# Patient Record
Sex: FEMALE | Race: BLACK OR AFRICAN AMERICAN | NOT HISPANIC OR LATINO
[De-identification: names, ages, dates, MRNs, and addresses within clinical notes are randomized per-mention and may not be internally consistent; named-entity substitution may affect disease eponyms.]

---

## 2019-05-22 PROBLEM — Z00.00 ENCOUNTER FOR PREVENTIVE HEALTH EXAMINATION: Status: ACTIVE | Noted: 2019-05-22

## 2019-05-30 ENCOUNTER — APPOINTMENT (OUTPATIENT)
Dept: OTOLARYNGOLOGY | Facility: CLINIC | Age: 44
End: 2019-05-30
Payer: COMMERCIAL

## 2019-05-30 VITALS
HEIGHT: 64 IN | SYSTOLIC BLOOD PRESSURE: 130 MMHG | BODY MASS INDEX: 22.2 KG/M2 | DIASTOLIC BLOOD PRESSURE: 78 MMHG | WEIGHT: 130 LBS | HEART RATE: 88 BPM

## 2019-05-30 DIAGNOSIS — Z80.9 FAMILY HISTORY OF MALIGNANT NEOPLASM, UNSPECIFIED: ICD-10-CM

## 2019-05-30 PROCEDURE — 99203 OFFICE O/P NEW LOW 30 MIN: CPT | Mod: 25

## 2019-05-30 PROCEDURE — 31575 DIAGNOSTIC LARYNGOSCOPY: CPT

## 2019-05-30 RX ORDER — METHIMAZOLE 5 MG/1
TABLET ORAL
Refills: 0 | Status: ACTIVE | COMMUNITY

## 2019-05-30 NOTE — HISTORY OF PRESENT ILLNESS
[de-identified] : MAEGAN ALEJANDRA is a 44 year woman with a history of Grave's disease who complains of one month os foreign body sensation in her throat. She feels it is mostly on the right side deep down Her voice is normal. She has not other symptoms. She feels the sensation the most when swallowing. She is not aware anything making any difference although she and a glass of proseco last nigh and feels worse today.\par

## 2019-05-30 NOTE — REASON FOR VISIT
[Throat Pain] : throat pain [Initial Consultation] : an initial consultation for [FreeTextEntry2] : foreign body sensation in throat.

## 2019-07-23 ENCOUNTER — APPOINTMENT (OUTPATIENT)
Dept: OTOLARYNGOLOGY | Facility: CLINIC | Age: 44
End: 2019-07-23

## 2019-07-30 ENCOUNTER — APPOINTMENT (OUTPATIENT)
Dept: OTOLARYNGOLOGY | Facility: CLINIC | Age: 44
End: 2019-07-30
Payer: COMMERCIAL

## 2019-07-30 VITALS
BODY MASS INDEX: 22.2 KG/M2 | DIASTOLIC BLOOD PRESSURE: 78 MMHG | WEIGHT: 130 LBS | HEART RATE: 94 BPM | HEIGHT: 64 IN | SYSTOLIC BLOOD PRESSURE: 130 MMHG

## 2019-07-30 PROCEDURE — 31575 DIAGNOSTIC LARYNGOSCOPY: CPT

## 2019-07-30 PROCEDURE — 99213 OFFICE O/P EST LOW 20 MIN: CPT | Mod: 25

## 2019-07-30 NOTE — HISTORY OF PRESENT ILLNESS
[de-identified] : MAEGAN ALEJANDRA is a 44 year woman with a history of Grave's disease who had one month of foreign body sensation. She has been on reflux diet and used Prilosec for 2 weeks. She feels much better.

## 2019-07-30 NOTE — REASON FOR VISIT
[Gastroesophageal Reflux] : gastroesophageal reflux [Subsequent Evaluation] : a subsequent evaluation for [FreeTextEntry2] : lpr

## 2020-07-07 ENCOUNTER — APPOINTMENT (OUTPATIENT)
Dept: OTOLARYNGOLOGY | Facility: CLINIC | Age: 45
End: 2020-07-07
Payer: COMMERCIAL

## 2020-07-07 VITALS — WEIGHT: 130 LBS | BODY MASS INDEX: 22.2 KG/M2 | HEIGHT: 64 IN

## 2020-07-07 DIAGNOSIS — E05.00 THYROTOXICOSIS WITH DIFFUSE GOITER W/OUT THYROTOXIC CRISIS OR STORM: ICD-10-CM

## 2020-07-07 PROCEDURE — 31575 DIAGNOSTIC LARYNGOSCOPY: CPT

## 2020-07-07 PROCEDURE — 99213 OFFICE O/P EST LOW 20 MIN: CPT | Mod: 25

## 2020-07-07 NOTE — ASSESSMENT
[FreeTextEntry1] : MAEGAN ALEJANDRA is having exacerbation of LPR. I suggested and discussed in detail a strict reflux diet and gave the patient a handout.\par I am recommending Prilosec 20 mg 30-40 minutes before breakfast on an empty stomach.\par I am recommending Pepcid 20mg  before bedtime.\par \par RTC 3-4 weeks.

## 2020-07-07 NOTE — HISTORY OF PRESENT ILLNESS
[de-identified] : MAEGAN ALEJANDRA is a 45 year woman with a history of Graves disease who I treated last year for LPR. She complains of several weeks of balloon sensation in her throat. Her voice is good and she is eating normally. She also had a sense of numbness on her right face which is improved.

## 2020-08-04 ENCOUNTER — APPOINTMENT (OUTPATIENT)
Dept: OTOLARYNGOLOGY | Facility: CLINIC | Age: 45
End: 2020-08-04
Payer: COMMERCIAL

## 2020-08-04 VITALS — HEIGHT: 64 IN | WEIGHT: 130 LBS | BODY MASS INDEX: 22.2 KG/M2 | TEMPERATURE: 98.1 F

## 2020-08-04 DIAGNOSIS — K21.9 GASTRO-ESOPHAGEAL REFLUX DISEASE W/OUT ESOPHAGITIS: ICD-10-CM

## 2020-08-04 DIAGNOSIS — R09.89 OTHER SPECIFIED SYMPTOMS AND SIGNS INVOLVING THE CIRCULATORY AND RESPIRATORY SYSTEMS: ICD-10-CM

## 2020-08-04 PROCEDURE — 31575 DIAGNOSTIC LARYNGOSCOPY: CPT

## 2020-08-04 RX ORDER — FAMOTIDINE 10 MG/1
TABLET, FILM COATED ORAL
Refills: 0 | Status: ACTIVE | COMMUNITY

## 2020-08-04 NOTE — ASSESSMENT
[FreeTextEntry1] : MAEGAN ALEJANDRA has improved but ongoing LPR. She had some stomach discomfort with Prilosec which she stopped after 2 weeks. I suggested and discussed in detail a strict reflux diet and gave the patient a handout.\par I am recommending Prilosec 20 mg 30-40 minutes before breakfast on an empty stomach.\par I am recommending Pepcid 20mg  before bedtime.\par \par I asked her to call me in 2-3 weeks and follow up visit in about 6 weeks.

## 2020-09-11 DIAGNOSIS — R59.9 ENLARGED LYMPH NODES, UNSPECIFIED: ICD-10-CM

## 2020-10-07 ENCOUNTER — APPOINTMENT (OUTPATIENT)
Dept: ULTRASOUND IMAGING | Facility: HOSPITAL | Age: 45
End: 2020-10-07
Payer: COMMERCIAL

## 2020-10-07 ENCOUNTER — RESULT REVIEW (OUTPATIENT)
Age: 45
End: 2020-10-07

## 2020-10-07 ENCOUNTER — OUTPATIENT (OUTPATIENT)
Dept: OUTPATIENT SERVICES | Facility: HOSPITAL | Age: 45
LOS: 1 days | End: 2020-10-07
Payer: COMMERCIAL

## 2020-10-07 PROCEDURE — 88173 CYTOPATH EVAL FNA REPORT: CPT

## 2020-10-07 PROCEDURE — 20206 BIOPSY MUSCLE PERQ NEEDLE: CPT

## 2020-10-07 PROCEDURE — 88305 TISSUE EXAM BY PATHOLOGIST: CPT | Mod: 26

## 2020-10-07 PROCEDURE — 88189 FLOWCYTOMETRY/READ 16 & >: CPT

## 2020-10-07 PROCEDURE — 76942 ECHO GUIDE FOR BIOPSY: CPT

## 2020-10-07 PROCEDURE — 76942 ECHO GUIDE FOR BIOPSY: CPT | Mod: 26

## 2020-10-07 PROCEDURE — 88305 TISSUE EXAM BY PATHOLOGIST: CPT

## 2020-10-07 PROCEDURE — 88173 CYTOPATH EVAL FNA REPORT: CPT | Mod: 26

## 2020-10-08 LAB — NON-GYNECOLOGICAL CYTOLOGY STUDY: SIGNIFICANT CHANGE UP

## 2022-06-22 ENCOUNTER — NON-APPOINTMENT (OUTPATIENT)
Age: 47
End: 2022-06-22

## 2023-06-18 ENCOUNTER — NON-APPOINTMENT (OUTPATIENT)
Age: 48
End: 2023-06-18

## 2023-09-25 NOTE — HISTORY OF PRESENT ILLNESS
Purpose: Pt attended teletherapy appt w/ Pattie Patricia, PhD, licensed clinical psychologist at [facility]. Pt confirmed that they were at the address identified in the EMR and had privacy. Pt consented to teletherapy. Pt reports that she was sick last week and went to the doctor w/ suspected flu. Pt also informed by her doctor that she has an enlarged heart and has a scheduled appt w/ a cardiologist this week. Pt was tired and rated her physical pain at 8/10. Pt described her various physical maladies including fibromyalgia and her lack of support from her family including her granddaughter. Pt states, \"I'm sad b/c I'm not the same as I used to be. I think I did this to myself.\" Pt mentioned that she feels responsible for her health conditions b/c she didn't take her blood pressure medication as prescribed. Pt communicated that she's committed to improving her self-care.      Intervention: Dr. Patricia provided pt w/ unconditional positive regard and empathy. Dr. Patricia used reflective statements to recognize and validate pt's emotions. Dr. Patricia reframed pt's negative thoughts. Dr. Patricia provided pt w/ psychoeducation about CBT and relationship between thoughts, pain management and mood. Dr. Patricia scheduled intake for pt's granddaughter on 10/05/23.      Mental status:    Pt's attitude was open, sad mood, w/ congruent affect.  Pt was oriented x4.  Concentration appeared focused.  Immediate, recent, and remote memory was WNL.  Speech was spontaneous. Thoughts appeared ruminating.  Pt denied SI, HI, and psychosis.  Behavior was observed to be cooperative.  Judgment intact w/ good insight.        Plan: Next appt is 10/04/23.      
[de-identified] : MAEGAN ALEJANDRA is a 45 year woman with a history of recent exacerbation of LPR. She has been on strict reflux diet and Prilosec and Pepcid and is feeling better. She is being worked up for possible pituitary adenoma.

## 2023-12-18 ENCOUNTER — NON-APPOINTMENT (OUTPATIENT)
Age: 48
End: 2023-12-18

## 2024-06-13 ENCOUNTER — NON-APPOINTMENT (OUTPATIENT)
Age: 49
End: 2024-06-13

## 2024-06-13 ENCOUNTER — APPOINTMENT (OUTPATIENT)
Dept: OPHTHALMOLOGY | Facility: CLINIC | Age: 49
End: 2024-06-13
Payer: COMMERCIAL

## 2024-06-13 PROCEDURE — 92250 FUNDUS PHOTOGRAPHY W/I&R: CPT

## 2024-06-13 PROCEDURE — 92060 SENSORIMOTOR EXAMINATION: CPT

## 2024-06-13 PROCEDURE — 92004 COMPRE OPH EXAM NEW PT 1/>: CPT

## 2024-06-13 PROCEDURE — 92083 EXTENDED VISUAL FIELD XM: CPT

## 2025-06-12 ENCOUNTER — APPOINTMENT (OUTPATIENT)
Dept: OPHTHALMOLOGY | Facility: CLINIC | Age: 50
End: 2025-06-12
Payer: COMMERCIAL

## 2025-06-12 ENCOUNTER — NON-APPOINTMENT (OUTPATIENT)
Age: 50
End: 2025-06-12

## 2025-06-12 PROCEDURE — 92014 COMPRE OPH EXAM EST PT 1/>: CPT

## 2025-06-12 PROCEDURE — 92250 FUNDUS PHOTOGRAPHY W/I&R: CPT

## 2025-06-12 PROCEDURE — 92083 EXTENDED VISUAL FIELD XM: CPT
